# Patient Record
Sex: MALE | Race: WHITE | NOT HISPANIC OR LATINO | Employment: OTHER | URBAN - METROPOLITAN AREA
[De-identification: names, ages, dates, MRNs, and addresses within clinical notes are randomized per-mention and may not be internally consistent; named-entity substitution may affect disease eponyms.]

---

## 2022-08-29 ENCOUNTER — TELEPHONE (OUTPATIENT)
Dept: ORTHOPEDICS | Facility: CLINIC | Age: 52
End: 2022-08-29

## 2022-08-29 NOTE — TELEPHONE ENCOUNTER
Patient was seen a few days ago at [facility] diagnosed with a fractured fibula however the physician on call is not in network and would not schedule him an appointment.     [facility] ED navigator called [facility] today to help this patient schedule with an orhtopedic in [facility] with his insurance [facility]. Pt was seen in the ED at [facility] 8.26.22. Rad images were not performed since he was seen at [place] and [facility] orthopedic physician was not on call     I did agree to schedule an appt for the patient even though our physician was not on call either.  Isabelle has shared the office number with patient, he will call to schedule

## 2024-04-26 PROBLEM — S61.409A: Status: ACTIVE | Noted: 2024-04-26

## 2024-04-26 PROBLEM — S66.829A: Status: ACTIVE | Noted: 2024-04-26
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
## 2024-05-03 ENCOUNTER — OFFICE VISIT (OUTPATIENT)
Dept: ORTHOPEDICS | Facility: CLINIC | Age: 54
End: 2024-05-03
Payer: MEDICAID

## 2024-05-03 DIAGNOSIS — S66.829A EXTENSOR TENDON LACERATION OF HAND WITH OPEN WOUND, INITIAL ENCOUNTER: Primary | ICD-10-CM

## 2024-05-03 DIAGNOSIS — S61.409A EXTENSOR TENDON LACERATION OF HAND WITH OPEN WOUND, INITIAL ENCOUNTER: Primary | ICD-10-CM

## 2024-05-03 PROCEDURE — 99024 POSTOP FOLLOW-UP VISIT: CPT | Mod: ,,, | Performed by: ORTHOPAEDIC SURGERY

## 2024-05-03 PROCEDURE — 29125 APPL SHORT ARM SPLINT STATIC: CPT | Mod: PBBFAC,PO | Performed by: ORTHOPAEDIC SURGERY

## 2024-05-03 PROCEDURE — 99212 OFFICE O/P EST SF 10 MIN: CPT | Mod: PBBFAC,PO | Performed by: ORTHOPAEDIC SURGERY

## 2024-05-03 PROCEDURE — 99999PBSHW PR PBB SHADOW TECHNICAL ONLY FILED TO HB: Mod: PBBFAC,,,

## 2024-05-03 PROCEDURE — 29125 APPL SHORT ARM SPLINT STATIC: CPT | Mod: S$PBB,58,LT, | Performed by: ORTHOPAEDIC SURGERY

## 2024-05-03 PROCEDURE — 99999 PR PBB SHADOW E&M-EST. PATIENT-LVL II: CPT | Mod: PBBFAC,,, | Performed by: ORTHOPAEDIC SURGERY

## 2024-05-06 ENCOUNTER — CLINICAL SUPPORT (OUTPATIENT)
Dept: REHABILITATION | Facility: HOSPITAL | Age: 54
End: 2024-05-06
Attending: ORTHOPAEDIC SURGERY
Payer: MEDICAID

## 2024-05-06 DIAGNOSIS — S61.409A EXTENSOR TENDON LACERATION OF HAND WITH OPEN WOUND, INITIAL ENCOUNTER: ICD-10-CM

## 2024-05-06 DIAGNOSIS — S66.829A EXTENSOR TENDON LACERATION OF HAND WITH OPEN WOUND, INITIAL ENCOUNTER: ICD-10-CM

## 2024-05-06 DIAGNOSIS — M25.642 STIFFNESS OF JOINT, HAND, LEFT: Primary | ICD-10-CM

## 2024-05-06 DIAGNOSIS — R29.898 WEAKNESS OF LEFT HAND: ICD-10-CM

## 2024-05-06 DIAGNOSIS — M25.542 PAIN, JOINT, HAND, LEFT: ICD-10-CM

## 2024-05-06 DIAGNOSIS — R60.0 EDEMA OF HAND: ICD-10-CM

## 2024-05-06 PROCEDURE — L3808 WHFO, RIGID W/O JOINTS: HCPCS | Mod: PO

## 2024-05-06 PROCEDURE — 97166 OT EVAL MOD COMPLEX 45 MIN: CPT | Mod: PO

## 2024-05-06 NOTE — PLAN OF CARE
[facility]  Occupational Therapy Initial Evaluation     Name: Junaid Agarwal  Clinic Number: [id number]  5/6/2024    Therapy Diagnosis:   Encounter Diagnoses   Name Primary?    Extensor tendon laceration of hand with open wound, initial encounter     Stiffness of joint, hand, left Yes    Pain, joint, hand, left     Weakness of left hand     Edema of hand      Physician: Hakeem Sevilla MD    Physician Orders: Note:  Please begin therapy to the left hand.  Extensor tendon repair protocol.  Therapy must begin no later than Tuesday May 7th.  Please create a volar blocking thermoplastic custom splint      Frequency:  3 times per week     Duration:  6 weeks      Diagnosis: Status post left thumb extensor tendon repair    Medical Diagnosis: S66.829A,S61.409A (ICD-10-CM) - Extensor tendon laceration of hand with open wound, initial encounter     Surgical Procedure and Date: PROCEDURE:   1. Irrigation and debridement of wound measuring 3 x 1 cm in size including skin and subcutaneous tissue   2. Left thumb/hand extensor pollicis longus and brevis tendon repair     DATE OF SURGERY: 04/26/2024     S/P: 10 days on 5/6/2024  Evaluation Date: 5/6/2024  Insurance Authorization Period Expiration:  Calendar year    Plan of Care Certification Period: 6 weeks = 6/14/2024  Date of Return to Referring Provider: 5/10/2024  Visit # / Visits authorized: 1/ 1  FOTO: Adrien  / Ana  Next Reassessment at 10th visit or by 30 days = 6/05/2024    Time In: 0745  Time Out:  0830  Total Appointment Time (timed & untimed codes): 45 minutes      Precautions:  standard and post op per protocol       Subjective     Date of Onset: 4/26/2024    History of Current Condition/Mechanism of Injury: Per HPI and/or pt report   HPI:  Junaid Agarwal is a 53 y.o. male, who presents to the emergency room today.  He was using a saw earlier today when the saw hit him on the left thumb.  He was noted have a laceration.  In the ER he was  evaluated.  There is a possible tendon laceration of his extensor tendon.  He was therefore admitted to 23 hour observation for to undergo irrigation and debridement of the wound with possible tendon repair.  He only complains of an injury to his left thumb. (Pt underwent repair per above)    Involved Side: Left    Dominant Side: Right    Prior Surgical Procedure or injuries to:   RUE None reported   LUE None reported    Imaging:  See Epic    Prior Therapy: None reported    Pain:  Functional Pain Scale Rating 0-10:   2/10 on average  2/10 at best  2/10 at worst  Location: L thumb/wrist  Description: Dull  Aggravating Factors: N/A  Easing Factors: Elevation    Occupation:  Construction work  Working presently: employed  Duties: Performing light duty, no use of L hand    Functional Limitations/Social History:    Previous functional status includes: Independent with all ADLs.     Current Functional Status   Home/Living environment: at home        Limitation of Functional Status as follows:   ADLs/IADLs: Max overall limitations reported with involved UE (s)     FOTO to follow     Patient's Goals for Therapy: Get L hand back to normal function and return to full duty work.    Past Medical History/Physical Systems Review:   Junaid Agarwal  has no past medical history on file.    Junaid Agarwal  has a past surgical history that includes Finger tendon repair (Left, 4/26/2024); Irrigation and debridement of upper extremity (Left, 4/26/2024); and exploration, wound, upper extremity (Left, 4/26/2024).    Junaid has a current medication list which includes the following prescription(s): oxycodone.    Review of patient's allergies indicates:  No Known Allergies   Objective     CMS Impairment/Limitation/Restriction for FOTO Hand Survey: to follow    Observation/Inspection/Wound/Incision/Scar Post op plaster thumb spica in place. Thumb redressed with simple gauze wrap and stockingnette. Mild to moderate edema,  non pitting,   Incision closed with no drainage, sutures remain intact.    Sensation: Grossly WNL,    PROM ext L thumb +25 at IP and +15 at MP (WNL vs AROM on R)         Manual Muscle Test: NT                                       and Pinch Strength: NT at this time due to post operative status.    Special and/or Standardized Tests:  NT      Treatment     Treatment Time In:  0800  Treatment Time Out:  0830  Total Treatment time separate from Evaluation time: 30 minutes.    Stefano received therapeutic exercises for 5 minutes including: Initial Home Exercise Program Instruction for PROM ext and AROM flexion of L hand in splint at all times.    Orthotic mgmt/train: 25 min for custom volar blocking splint Yosef/train (FA based) for L thumb/wrist.    Home Exercise Program/Education:  Issued HEP (see patient instructions in EMR) and educated on modality use for pain management . Exercises were reviewed and Stefano was able to demonstrate them prior to the end of the session.   Pt received a written copy of exercises to perform at home. Stefano demonstrated good  understanding of the education provided.  Pt was advised to perform these exercises free of pain, and to stop performing them if pain occurs.    Patient/Family Education: role of OT, goals for OT, scheduling/cancellations - pt verbalized understanding.     Additional Education provided: N/A    Assessment     Junaid Agarwal is a 53 y.o. male referred to outpatient occupational therapy and presents with a medical diagnosis of S/P L thumb EPL/EPB lacerations/repairs.    Following medical record review it is determined that pt will benefit from occupational therapy services in order to maximize pain free and/or functional use of left UE. The following goals were discussed with the patient and patient is in agreement with them as to be addressed in the treatment plan. The patient's rehab potential is good.     Anticipated barriers to occupational therapy:  None.    Plan of care  discussed with patient:   Yes  Patient's spiritual, cultural and educational needs considered and patient is agreeable to the plan of care and goals as stated below:     Medical Necessity is demonstrated by the following  Occupational Profile/History  Co-morbidities and personal factors that may impact the plan of care [] LOW: Brief chart review  [x] MODERATE: Expanded chart review   [] HIGH: Extensive chart review    Moderate / High Support Documentation:  per chart review and pt report      Examination  Performance deficits relating to physical, cognitive or psychosocial skills that result in activity limitations and/or participation restrictions  [] LOW: addressing 1-3 Performance deficits  [] MODERATE: 3-5 Performance deficits  [x] HIGH: 5+ Performance deficits (please support below)    Moderate / High Support Documentation:  Per below problem list    Physical:  Joint Mobility  Muscle Power/Strength  Muscle Endurance  Skin Integrity/Scar Formation  Edema   Strength  Pinch Strength  Gross Motor Coordination  Fine Motor Coordination  Pain    Cognitive:  No Deficits    Psychosocial:    No Deficits     Treatment Options [] LOW: Limited options  [x] MODERATE: Several options  [] HIGH: Multiple options      Decision Making/ Complexity Score: moderate         The following goals were discussed with the patient and patient is in agreement with them as to be addressed in the treatment plan.     Goals:     Short Term Goals: (30 days, 6/5/2024, and/or 10th visit) unless otherwise noted below.  1. Pt will be independent with HEP and splint wear/care and precautions in 1 visit (MET).  2. Pt will report decreased pain to a 1/10 at worst in LUE with ADLs in order to increase function/use of UE.   3. Pt will demo place and hold grossly WNL at 4 week post-op swapnil in prep for return to normal use of L hand for ADL.     Long Term Goals: (by discharge)  1. Pt will report decreased pain to 1-2/10 with ADLs to allow for increased  function/use of UE.   2. Pt will exhibit WNL AROM of  L Wrist/hand to allow for Independent use of for all ADL/IADL tasks.  3. Pt will exhibit WFL  strength to allow a firm grasp during ADL/IADL (cooking utensils, tool use, carrying groceries, steering wheel, etc.)  4. Pt will exhibit WFL lateral pinch strength to allow writing,opening containers, and turning keys.  5  Pt will return to [facility] with all ADL/IADL, leisure and job tasks.    Plan   Plan of care expiration: 6 weeks = 6/14/2024      Outpatient Occupational Therapy 3 times weekly through current poc expiration date, to include the following interventions:     Moist heat, cold packs, paraffin, fluidotherapy, US, edema control, scar mobilization/scar massage, manual therapy/joint mobilizations,A/PROM, therapeutic exercises/activities, strengthening, desensitization/sensory re-education, orthotic fitting/fabrication/training, joint protections/energry conservation/adaptive equipment/activity modification, HEP/education as well as any other treatments deemed necessary based on the patient's needs or progress.     Updates Next Visit: To progress per protocol.      MERI Smith, PREETIT

## 2024-05-10 ENCOUNTER — TELEPHONE (OUTPATIENT)
Dept: ORTHOPEDICS | Facility: CLINIC | Age: 54
End: 2024-05-10

## 2024-05-10 ENCOUNTER — OFFICE VISIT (OUTPATIENT)
Dept: ORTHOPEDICS | Facility: CLINIC | Age: 54
End: 2024-05-10
Payer: MEDICAID

## 2024-05-10 VITALS — BODY MASS INDEX: 24.38 KG/M2 | WEIGHT: 180 LBS | HEIGHT: 72 IN

## 2024-05-10 DIAGNOSIS — S61.409A EXTENSOR TENDON LACERATION OF HAND WITH OPEN WOUND, INITIAL ENCOUNTER: Primary | ICD-10-CM

## 2024-05-10 DIAGNOSIS — S66.829A EXTENSOR TENDON LACERATION OF HAND WITH OPEN WOUND, INITIAL ENCOUNTER: Primary | ICD-10-CM

## 2024-05-10 PROCEDURE — 1159F MED LIST DOCD IN RCRD: CPT | Mod: CPTII,,, | Performed by: ORTHOPAEDIC SURGERY

## 2024-05-10 PROCEDURE — 99024 POSTOP FOLLOW-UP VISIT: CPT | Mod: ,,, | Performed by: ORTHOPAEDIC SURGERY

## 2024-05-10 PROCEDURE — 99212 OFFICE O/P EST SF 10 MIN: CPT | Mod: PBBFAC,PO | Performed by: ORTHOPAEDIC SURGERY

## 2024-05-10 PROCEDURE — 99999 PR PBB SHADOW E&M-EST. PATIENT-LVL II: CPT | Mod: PBBFAC,,, | Performed by: ORTHOPAEDIC SURGERY

## 2024-05-10 RX ORDER — HYDROCODONE BITARTRATE AND ACETAMINOPHEN 5; 325 MG/1; MG/1
1 TABLET ORAL EVERY 8 HOURS PRN
Qty: 8 TABLET | Refills: 0 | Status: SHIPPED | OUTPATIENT
Start: 2024-05-10

## 2024-05-10 NOTE — PROGRESS NOTES
Mr Agarwal returns to clinic today.  He was approximately 2 weeks status post left thumb wound exploration with extensor tendon repair.  He was overall doing well.  He has gone to therapy to have a splint made.  He starts the therapy protocol next week     Physical exam:  Examination of the left hand and thumb reveals that the incision is healing well.  There are sutures in place.  There is still mild edema.  He does report some decreased sensation just distal to the laceration on the dorsum of the thumb.  Capillary refill is less than 2 seconds     Assessment:  Status post left thumb wound exploration with extensor tendon repair     Plan:    1.  Sutures were removed today and Steri-Strips are placed    2.  Will continue to wear the thermoplastic splint    3. Will start working with therapy for the extensor tendon repair protocol    4.  He will follow up in 3 weeks for repeat evaluation

## 2024-05-10 NOTE — TELEPHONE ENCOUNTER
----- Message from Bill Lang sent at 5/10/2024 12:47 PM CDT -----  Patient states that his refill needs a Diagnosis Code:    HYDROcodone-acetaminophen (NORCO) 5-325 mg per tablet      [id number] - [place], [place] - [address] [address] AT [address] [address] [address] [address]  [id number] [place] [id number]  Phone: [phone] Fax: [phone]    Pt is at the pharmacy right now--  [phone]

## 2024-05-12 NOTE — PROGRESS NOTES
Mr Agarwal returns to clinic today has a history of a laceration of his left thumb with extensor tendon repair.  He was overall doing well.  He has started working with therapy.      Physical exam:  Examination of the left thumb reveals that the wound is healing well.  There are sutures in place.  There is mild edema without significant erythema or drainage.  He was able to the thumb fully extended position    Assessment: Status post left thumb extensor tendon repair    Plan:    1. His wounds were cleaned today and a thumb spica plaster splint was placed    2.  He will go to see the therapist to have thermoplastic splint created and to begin the extensor tendon repair protocol     Three.  Will follow up in 1 week for repeat evaluation which point we will consider

## 2024-05-13 ENCOUNTER — CLINICAL SUPPORT (OUTPATIENT)
Dept: REHABILITATION | Facility: HOSPITAL | Age: 54
End: 2024-05-13
Payer: MEDICAID

## 2024-05-13 DIAGNOSIS — S61.409A EXTENSOR TENDON LACERATION OF HAND WITH OPEN WOUND, INITIAL ENCOUNTER: Primary | ICD-10-CM

## 2024-05-13 DIAGNOSIS — R60.0 EDEMA OF HAND: ICD-10-CM

## 2024-05-13 DIAGNOSIS — M25.642 STIFFNESS OF JOINT, HAND, LEFT: ICD-10-CM

## 2024-05-13 DIAGNOSIS — S66.829A EXTENSOR TENDON LACERATION OF HAND WITH OPEN WOUND, INITIAL ENCOUNTER: Primary | ICD-10-CM

## 2024-05-13 DIAGNOSIS — R29.898 WEAKNESS OF LEFT HAND: ICD-10-CM

## 2024-05-13 DIAGNOSIS — M25.542 PAIN, JOINT, HAND, LEFT: ICD-10-CM
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
## 2024-05-13 PROCEDURE — 97530 THERAPEUTIC ACTIVITIES: CPT | Mod: PO

## 2024-05-13 NOTE — PROGRESS NOTES
[facility]  Occupational Therapy Treatment Note     Date: 5/13/2024  Name: Junaid Agarwal  [facility] Number: [id number]    Therapy Diagnosis:   Encounter Diagnoses   Name Primary?    Extensor tendon laceration of hand with open wound, initial encounter Yes    Contact with powered saw as cause of accidental injury     Edema of hand     Stiffness of joint, hand, left     Pain, joint, hand, left     Weakness of left hand      Physician: Hakeem Sevilla MD  Note:  Please begin therapy to the left hand.  Extensor tendon repair protocol.  Therapy must begin no later than Tuesday May 7th.  Please create a volar blocking thermoplastic custom splint      Frequency:  3 times per week     Duration:  6 weeks      Diagnosis: Status post left thumb extensor tendon repair    And per ortho note from 5/10/2024  Plan:     1.  Sutures were removed today and Steri-Strips are placed     2.  Will continue to wear the thermoplastic splint     3. Will start working with therapy for the extensor tendon repair protocol     4.  He will follow up in 3 weeks for repeat evaluation   Medical Diagnosis: S66.829A,S61.409A (ICD-10-CM) - Extensor tendon laceration of hand with open wound, initial encounter     Surgical Procedure and Date: PROCEDURE:   1. Irrigation and debridement of wound measuring 3 x 1 cm in size including skin and subcutaneous tissue   2. Left thumb/hand extensor pollicis longus and brevis tendon repair   DATE OF SURGERY: 04/26/2024      S/P: 10 days on 5/6/2024    Evaluation Date: 5/13/2024  Insurance Authorization Period Expiration:  Calendar year    Plan of Care Certification Period: 6 weeks = 6/14/2024  Date of Return to Referring Provider: 5/10/2024  Visit # / Visits authorized:  2 / 20  FOTO: Eval  / 1  Next Reassessment at 10th visit or by 30 days = 6/05/2024     Time In:  1645  Time Out:  1707  Total Appointment Time (timed & untimed codes): 23 minutes        Precautions:  standard and post op  per protocol   Subjective     Pt reports: Sutures were removed last Friday and he is doing well, only a little soreness from splint near ulnar styloid.  He was compliant with home exercise program given last session.   Response to previous treatment: Good  Functional change: None allowed, full time splint wear at this time     Pain 0-10 scale   Functional Pain Scale Rating 0-10:   2/10 on average  2/10 at best  2/10 at worst  Location: L thumb/wrist  Description: Dull  Aggravating Factors: N/A  Easing Factors: Elevation    Objective   MEASUREMENTS  Last measurements below are from Eval unless otherwise noted.    CMS Impairment/Limitation/Restriction for FOTO Hand Survey: Hand, #2nd visit = 50%     Observation/Inspection/Wound/Incision/Scar wearing his volar blocking splint, only thin scabs remain..     Sensation: Grossly WNL,     PROM ext L thumb +25 at IP and +15 at MP (WNL vs AROM on R)         Manual Muscle Test: NT                                       and Pinch Strength: NT at this time due to post operative status.     Special and/or Standardized Tests:  NT    Treatment     Stefano received the following supervised modalities after being cleared for contradictions for 0 minutes:   NT      Stefano received the following manual therapy techniques for 8 minutes:   - Gentle scar mob, retrograde massage to L thumb/scar    Stefano received therapeutic exercises for 15 minutes including:  PROM thumb ext and AROM to block of splint 3x10  PROM 3x30 sec in ext  AAROM hook fist digits 2-5 x3 each  Gentle PROM wrist ext 3x30 sec.  Flat fist on table x 2 min    Small area by styloid heated and bumped out for increased comfort. (During PROM flat fist on table)    Stefano participated in dynamic functional therapeutic activities to improve functional performance for 0  minutes, including:  NT    Home Exercises and Education Provided     Education provided:   -  Cont per previous. Can begin very gentle scar massage,  circles using abx ointment.    Written Home Exercises Provided:  Patient instructed to cont prior HEP.    Exercises were reviewed and Stefano was able to demonstrate them prior to the end of the session.  Stefano demonstrated good  understanding of the education provided.     See EMR under Media for exercises provided today and/or prior visit.        Assessment     Pt would continue to benefit from skilled OT. Pt progressing very well     - Progress towards goals:  STG #1 has been met.    Stefano is progressing well towards his goals . Pt continues with good rehab potential.     Pt will continue to benefit from skilled outpatient occupational therapy to address the deficits listed in the problem list on initial evaluation in order to maximize pt's level of independence in the home and community.     Anticipated barriers to occupational therapy: None    Pt's spiritual, cultural and educational needs considered and pt agreeable to plan of care and goals.    Goals:  Short Term Goals: (30 days, 6/5/2024, and/or 10th visit) unless otherwise noted below.  1. Pt will be independent with HEP and splint wear/care and precautions in 1 visit (MET).  2. Pt will report decreased pain to a 1/10 at worst in LUE with ADLs in order to increase function/use of UE.   3. Pt will demo place and hold grossly WNL at 4 week post-op swapnil in prep for return to normal use of L hand for ADL.      Long Term Goals: (by discharge)  1. Pt will report decreased pain to 1-2/10 with ADLs to allow for increased function/use of UE.   2. Pt will exhibit WNL AROM of  L Wrist/hand to allow for Independent use of for all ADL/IADL tasks.  3. Pt will exhibit WFL  strength to allow a firm grasp during ADL/IADL (cooking utensils, tool use, carrying groceries, steering wheel, etc.)  4. Pt will exhibit WFL lateral pinch strength to allow writing,opening containers, and turning keys.  5  Pt will return to [facility] with all ADL/IADL, leisure and job tasks.    Plan    Continue Occupational Therapy 3 times per week through current poc expiration date of 6/14/2024, in order to to decrease pain and edema, and increase A/PROM, strength, and functional use of L upper extremity.    Updates/Grading for next session:  Progress with OT per protocol      MERI Smith CHT

## 2024-05-15 ENCOUNTER — CLINICAL SUPPORT (OUTPATIENT)
Dept: REHABILITATION | Facility: HOSPITAL | Age: 54
End: 2024-05-15
Payer: MEDICAID

## 2024-05-15 DIAGNOSIS — M25.542 PAIN, JOINT, HAND, LEFT: ICD-10-CM

## 2024-05-15 DIAGNOSIS — R60.0 EDEMA OF HAND: ICD-10-CM

## 2024-05-15 DIAGNOSIS — R29.898 WEAKNESS OF LEFT HAND: ICD-10-CM

## 2024-05-15 DIAGNOSIS — M25.642 STIFFNESS OF JOINT, HAND, LEFT: Primary | ICD-10-CM

## 2024-05-15 PROCEDURE — 97530 THERAPEUTIC ACTIVITIES: CPT | Mod: PO

## 2024-05-15 NOTE — PROGRESS NOTES
[facility]  Occupational Therapy Treatment Note     Date: 5/15/2024  Name: Junaid Agarwal  [facility] Number: [id number]    Therapy Diagnosis:   Encounter Diagnoses   Name Primary?    Stiffness of joint, hand, left Yes    Pain, joint, hand, left     Weakness of left hand     Edema of hand        Physician: Hakeem Sevilla MD  Note:  Please begin therapy to the left hand.  Extensor tendon repair protocol.  Therapy must begin no later than Tuesday May 7th.  Please create a volar blocking thermoplastic custom splint      Frequency:  3 times per week     Duration:  6 weeks      Diagnosis: Status post left thumb extensor tendon repair    And per ortho note from 5/10/2024  Plan:     1.  Sutures were removed today and Steri-Strips are placed     2.  Will continue to wear the thermoplastic splint     3. Will start working with therapy for the extensor tendon repair protocol     4.  He will follow up in 3 weeks for repeat evaluation   Medical Diagnosis: S66.829A,S61.409A (ICD-10-CM) - Extensor tendon laceration of hand with open wound, initial encounter     Surgical Procedure and Date: PROCEDURE:   1. Irrigation and debridement of wound measuring 3 x 1 cm in size including skin and subcutaneous tissue   2. Left thumb/hand extensor pollicis longus and brevis tendon repair   DATE OF SURGERY: 04/26/2024      S/P: 10 days on 5/6/2024    Evaluation Date: 5/13/2024  Insurance Authorization Period Expiration:  Calendar year    Plan of Care Certification Period: 6 weeks = 6/14/2024  Date of Return to Referring Provider: 5/10/2024  Visit # / Visits authorized:  3 / 20  FOTO: Adrien  / Ana  Next Reassessment at 10th visit or by 30 days = 6/05/2024     Time In:  1545  Time Out:  1600  Total Appointment Time (timed  & untimed codes): 15 minutes        Precautions:  standard and post op per protocol   Subjective     Pt reports: Thinks a suture is still in.  He was compliant with home exercise program given last  session.   Response to previous treatment: Good  Functional change: None allowed, full time splint wear at this time     Pain 0-10 scale   Functional Pain Scale Rating 0-10:   2/10 on average  2/10 at best  2/10 at worst  Location: L thumb/wrist  Description: Dull  Aggravating Factors: N/A  Easing Factors: Elevation    Objective   MEASUREMENTS  Last measurements below are from Eval unless otherwise noted.    CMS Impairment/Limitation/Restriction for FOTO Hand Survey: Hand, #2nd visit = 50%     Observation/Inspection/Wound/Incision/Scar wearing his volar blocking splint, only thin scabs remain..     Sensation: Grossly WNL,     PROM ext L thumb +25 at IP and +15 at MP (WNL vs AROM on R)         Manual Muscle Test: NT                                       and Pinch Strength: NT at this time due to post operative status.     Special and/or Standardized Tests:  NT    Treatment     Stefano received the following supervised modalities after being cleared for contradictions for 0 minutes:   NT      Stefano received the following manual therapy techniques for 2  minutes:   - Gentle scar mob, retrograde massage to L thumb/scar    Stefano received therapeutic exercises for 12 minutes including:  PROM thumb ext and AROM to block of splint 3x10  PROM 3x30 sec in ext  AAROM hook fist digits 2-5 x3 each (NT)  Gentle PROM wrist ext 3x30 sec.  Flat fist on table x 2 min    Suture removal: 1 stray suture at wed space removed this date with no bleeding and no pain reported, and no signs of infection. 1 min    Small area by styloid heated and bumped out for increased comfort. (During PROM flat fist on table)    Stefano participated in dynamic functional therapeutic activities to improve functional performance for 0  minutes, including:  NT    Home Exercises and Education Provided     Education provided:   -  Cont per previous. Wait until next date and can begin scar massage, circles using cocoa butter 1-2 x day..    Written Home  Exercises Provided:  Patient instructed to cont prior HEP.    Exercises were reviewed and Stefano was able to demonstrate them prior to the end of the session.  Stefano demonstrated good  understanding of the education provided.     See EMR under Media for exercises provided today and/or prior visit.        Assessment     Pt would continue to benefit from skilled OT. Pt progressing very well,      - Progress towards goals:  STG #1 has been met.    Stefano is progressing well towards his goals . Pt continues with good rehab potential.     Pt will continue to benefit from skilled outpatient occupational therapy to address the deficits listed in the problem list on initial evaluation in order to maximize pt's level of independence in the home and community.     Anticipated barriers to occupational therapy: None    Pt's spiritual, cultural and educational needs considered and pt agreeable to plan of care and goals.    Goals:  Short Term Goals: (30 days, 6/5/2024, and/or 10th visit) unless otherwise noted below.  1. Pt will be independent with HEP and splint wear/care and precautions in 1 visit (MET).  2. Pt will report decreased pain to a 1/10 at worst in LUE with ADLs in order to increase function/use of UE.   3. Pt will demo place and hold grossly WNL at 4 week post-op swapnil in prep for return to normal use of L hand for ADL.      Long Term Goals: (by discharge)  1. Pt will report decreased pain to 1-2/10 with ADLs to allow for increased function/use of UE.   2. Pt will exhibit WNL AROM of  L Wrist/hand to allow for Independent use of for all ADL/IADL tasks.  3. Pt will exhibit WFL  strength to allow a firm grasp during ADL/IADL (cooking utensils, tool use, carrying groceries, steering wheel, etc.)  4. Pt will exhibit WFL lateral pinch strength to allow writing,opening containers, and turning keys.  5  Pt will return to PLOF with all ADL/IADL, leisure and job tasks.    Plan   Continue Occupational Therapy 3 times  per week through current poc expiration date of 6/14/2024, in order to to decrease pain and edema, and increase A/PROM, strength, and functional use of L upper extremity.    Updates/Grading for next session:  Progress with OT per protocol      MERI Smith CHT

## 2024-05-20 ENCOUNTER — CLINICAL SUPPORT (OUTPATIENT)
Dept: REHABILITATION | Facility: HOSPITAL | Age: 54
End: 2024-05-20
Payer: MEDICAID

## 2024-05-20 DIAGNOSIS — R60.0 EDEMA OF HAND: ICD-10-CM

## 2024-05-20 DIAGNOSIS — R29.898 WEAKNESS OF LEFT HAND: ICD-10-CM

## 2024-05-20 DIAGNOSIS — M25.542 PAIN, JOINT, HAND, LEFT: ICD-10-CM

## 2024-05-20 DIAGNOSIS — M25.642 STIFFNESS OF JOINT, HAND, LEFT: Primary | ICD-10-CM

## 2024-05-20 PROCEDURE — 97530 THERAPEUTIC ACTIVITIES: CPT | Mod: PO

## 2024-05-20 NOTE — PROGRESS NOTES
[facility]  Occupational Therapy Treatment Note     Date: 5/20/2024  Name: Junaid Agarwal  [facility] Number: [id number]    Therapy Diagnosis:   Encounter Diagnoses   Name Primary?    Stiffness of joint, hand, left Yes    Pain, joint, hand, left     Weakness of left hand     Edema of hand          Physician: Hakeem Sevilla MD  Note:  Please begin therapy to the left hand.  Extensor tendon repair protocol.  Therapy must begin no later than Tuesday May 7th.  Please create a volar blocking thermoplastic custom splint      Frequency:  3 times per week     Duration:  6 weeks      Diagnosis: Status post left thumb extensor tendon repair    And per ortho note from 5/10/2024  Plan:     1.  Sutures were removed today and Steri-Strips are placed     2.  Will continue to wear the thermoplastic splint     3. Will start working with therapy for the extensor tendon repair protocol     4.  He will follow up in 3 weeks for repeat evaluation   Medical Diagnosis: S66.829A,S61.409A (ICD-10-CM) - Extensor tendon laceration of hand with open wound, initial encounter     Surgical Procedure and Date: PROCEDURE:   1. Irrigation and debridement of wound measuring 3 x 1 cm in size including skin and subcutaneous tissue   2. Left thumb/hand extensor pollicis longus and brevis tendon repair   DATE OF SURGERY: 04/26/2024      S/P: 10 days on 5/6/2024    Evaluation Date: 5/13/2024  Insurance Authorization Period Expiration:  Calendar year    Plan of Care Certification Period: 6 weeks = 6/14/2024  Date of Return to Referring Provider: 5/10/2024  Visit # / Visits authorized:  4 / 20  FOTO: Adrien  / Ana  Next Reassessment at 10th visit or by 30 days = 6/05/2024     Time In:  1638  Time Out: 1648  Total Appointment Time (timed  & untimed codes): 10 minutes        Precautions:  standard and post op per protocol   Subjective     Pt reports: Thinks a suture is still in.  He was compliant with home exercise program given last  session.   Response to previous treatment: Good  Functional change: None allowed, full time splint wear at this time     Pain 0-10 scale   Functional Pain Scale Rating 0-10:   2/10 on average  2/10 at best  2/10 at worst  Location: L thumb/wrist  Description: Dull  Aggravating Factors: N/A  Easing Factors: Elevation    Objective   MEASUREMENTS  Last measurements below are from Eval unless otherwise noted.    CMS Impairment/Limitation/Restriction for FOTO Hand Survey: Hand, #2nd visit = 50%     Observation/Inspection/Wound/Incision/Scar wearing his volar blocking splint, only thin scabs remain..     Sensation: Grossly WNL,     PROM ext L thumb +25 at IP and +15 at MP (WNL vs AROM on R)         Manual Muscle Test: NT                                       and Pinch Strength: NT at this time due to post operative status.     Special and/or Standardized Tests:  NT    Treatment     Stefano received the following supervised modalities after being cleared for contradictions for 0 minutes:   NT      Stefano received the following manual therapy techniques for 2  minutes:   - Gentle scar mob, retrograde massage to L thumb/scar    Stefano received therapeutic exercises for 8 minutes including:  PROM thumb ext and AROM to block of splint 3x10  PROM 3x30 sec in ext (NT)  AAROM hook fist digits 2-5 x3 each (NT)  Gentle PROM wrist ext 3x30 sec.  Flat fist on table x 2 min (NT)        Stefano participated in dynamic functional therapeutic activities to improve functional performance for 0  minutes, including:  NT    Home Exercises and Education Provided     Education provided:   -  Cont per previous.     Written Home Exercises Provided:  Patient instructed to cont prior HEP.    Exercises were reviewed and Stefano was able to demonstrate them prior to the end of the session.  Stefano demonstrated good  understanding of the education provided.     See EMR under Media for exercises provided today and/or prior visit.         Assessment     Pt would continue to benefit from skilled OT. Pt progressing very well,      - Progress towards goals:  STG #1 has been met.    Stefano is progressing well towards his goals . Pt continues with good rehab potential.     Pt will continue to benefit from skilled outpatient occupational therapy to address the deficits listed in the problem list on initial evaluation in order to maximize pt's level of independence in the home and community.     Anticipated barriers to occupational therapy: None    Pt's spiritual, cultural and educational needs considered and pt agreeable to plan of care and goals.    Goals:  Short Term Goals: (30 days, 6/5/2024, and/or 10th visit) unless otherwise noted below.  1. Pt will be independent with HEP and splint wear/care and precautions in 1 visit (MET).  2. Pt will report decreased pain to a 1/10 at worst in LUE with ADLs in order to increase function/use of UE.   3. Pt will demo place and hold grossly WNL at 4 week post-op swapnil in prep for return to normal use of L hand for ADL.      Long Term Goals: (by discharge)  1. Pt will report decreased pain to 1-2/10 with ADLs to allow for increased function/use of UE.   2. Pt will exhibit WNL AROM of  L Wrist/hand to allow for Independent use of for all ADL/IADL tasks.  3. Pt will exhibit WFL  strength to allow a firm grasp during ADL/IADL (cooking utensils, tool use, carrying groceries, steering wheel, etc.)  4. Pt will exhibit WFL lateral pinch strength to allow writing,opening containers, and turning keys.  5  Pt will return to PLOF with all ADL/IADL, leisure and job tasks.    Plan   Continue Occupational Therapy 3 times per week through current poc expiration date of 6/14/2024, in order to to decrease pain and edema, and increase A/PROM, strength, and functional use of L upper extremity.    Updates/Grading for next session:  Progress with OT per protocol      MERI Smith, ERIN

## 2024-05-22 ENCOUNTER — CLINICAL SUPPORT (OUTPATIENT)
Dept: REHABILITATION | Facility: HOSPITAL | Age: 54
End: 2024-05-22
Payer: MEDICAID

## 2024-05-22 DIAGNOSIS — R29.898 WEAKNESS OF LEFT HAND: ICD-10-CM

## 2024-05-22 DIAGNOSIS — M25.642 STIFFNESS OF JOINT, HAND, LEFT: Primary | ICD-10-CM

## 2024-05-22 DIAGNOSIS — M25.542 PAIN, JOINT, HAND, LEFT: ICD-10-CM

## 2024-05-22 DIAGNOSIS — R60.0 EDEMA OF HAND: ICD-10-CM

## 2024-05-22 PROCEDURE — 97530 THERAPEUTIC ACTIVITIES: CPT | Mod: PO

## 2024-05-22 NOTE — PROGRESS NOTES
[facility]  Occupational Therapy Treatment Note     Date: 5/22/2024  Name: Junaid Agarwal  [facility] Number: [id number]    Therapy Diagnosis:   Encounter Diagnoses   Name Primary?    Stiffness of joint, hand, left Yes    Pain, joint, hand, left     Weakness of left hand     Edema of hand        Physician: Hakeem Sevilla MD  Note:  Please begin therapy to the left hand.  Extensor tendon repair protocol.  Therapy must begin no later than Tuesday May 7th.  Please create a volar blocking thermoplastic custom splint      Frequency:  3 times per week     Duration:  6 weeks      Diagnosis: Status post left thumb extensor tendon repair    And per ortho note from 5/10/2024  Plan:     1.  Sutures were removed today and Steri-Strips are placed     2.  Will continue to wear the thermoplastic splint     3. Will start working with therapy for the extensor tendon repair protocol     4.  He will follow up in 3 weeks for repeat evaluation   Medical Diagnosis: S66.829A,S61.409A (ICD-10-CM) - Extensor tendon laceration of hand with open wound, initial encounter     Surgical Procedure and Date: PROCEDURE:   1. Irrigation and debridement of wound measuring 3 x 1 cm in size including skin and subcutaneous tissue   2. Left thumb/hand extensor pollicis longus and brevis tendon repair   DATE OF SURGERY: 04/26/2024      S/P: 3 weeks and 5 days on 5/22/2024    Evaluation Date: 5/13/2024  Insurance Authorization Period Expiration:  Calendar year    Plan of Care Certification Period: 6 weeks = 6/14/2024  Date of Return to Referring Provider: 5/10/2024  Visit # / Visits authorized:  5 / 20  FOTO: Adrien  / Ana  Next Reassessment at 10th visit or by 30 days = 6/05/2024     Time In: 1555  Time Out: 1610  Total Appointment Time (timed  & untimed codes): 15 minutes        Precautions:  standard and post op per protocol   Subjective     Pt reports: No problems or complaints  He was compliant with home exercise program given  last session.   Response to previous treatment: Good  Functional change: None allowed, full time splint wear at this time     Pain 0-10 scale   Functional Pain Scale Rating 0-10:   2/10 on average  2/10 at best  2/10 at worst  Location: L thumb/wrist  Description: Dull  Aggravating Factors: N/A  Easing Factors: Elevation    Objective   MEASUREMENTS  Last measurements below are from Eval unless otherwise noted.    CMS Impairment/Limitation/Restriction for FOTO Hand Survey: Hand, #2nd visit = 50%     Observation/Inspection/Wound/Incision/Scar wearing his volar blocking splint, only thin scabs remain..     Sensation: Grossly WNL,     PROM ext L thumb +25 at IP and +15 at MP (WNL vs AROM on R)         Manual Muscle Test: NT                                       and Pinch Strength: NT at this time due to post operative status.     Special and/or Standardized Tests:  NT    Treatment     Mr. Agarwal received the following supervised modalities after being cleared for contradictions for 0 minutes:   NT      Mr. Agarwal received the following manual therapy techniques for 5 minutes:   - Gentle scar mob, retrograde massage to L thumb/scar    Mr. Agarwal received therapeutic exercises for 10 minutes including:  PROM thumb ext and AROM to block of splint 3x10  PROM 3x30 sec in ext (NT)  AAROM hook fist digits 2-5 x3 each (NT)  Gentle PROM wrist ext 3x30 sec.  Flat fist on table x 2 min       Stefano participated in dynamic functional therapeutic activities to improve functional performance for 0  minutes, including:  NT    Home Exercises and Education Provided     Education provided:   -  Cont per previous.     Written Home Exercises Provided:  Patient instructed to cont prior HEP.    Exercises were reviewed and Mr. Agarwal was able to demonstrate them prior to the end of the session.  Stefano demonstrated good  understanding of the education provided.     See EMR under Media for exercises provided today and/or prior visit.         Assessment     Pt would continue to benefit from skilled OT. Pt progressing very well,      - Progress towards goals:  STG #1 has been met.    Stefano is progressing well towards his goals . Pt continues with good rehab potential.     Pt will continue to benefit from skilled outpatient occupational therapy to address the deficits listed in the problem list on initial evaluation in order to maximize pt's level of independence in the home and community.     Anticipated barriers to occupational therapy: None    Pt's spiritual, cultural and educational needs considered and pt agreeable to plan of care and goals.    Goals:  Short Term Goals: (30 days, 6/5/2024, and/or 10th visit) unless otherwise noted below.  1. Pt will be independent with HEP and splint wear/care and precautions in 1 visit (MET).  2. Pt will report decreased pain to a 1/10 at worst in LUE with ADLs in order to increase function/use of UE.   3. Pt will demo place and hold grossly WNL at 4 week post-op swapnil in prep for return to normal use of L hand for ADL.      Long Term Goals: (by discharge)  1. Pt will report decreased pain to 1-2/10 with ADLs to allow for increased function/use of UE.   2. Pt will exhibit WNL AROM of  L Wrist/hand to allow for Independent use of for all ADL/IADL tasks.  3. Pt will exhibit WFL  strength to allow a firm grasp during ADL/IADL (cooking utensils, tool use, carrying groceries, steering wheel, etc.)  4. Pt will exhibit WFL lateral pinch strength to allow writing,opening containers, and turning keys.  5  Pt will return to PLOF with all ADL/IADL, leisure and job tasks.    Plan   Continue Occupational Therapy 3 times per week through current poc expiration date of 6/14/2024, in order to to decrease pain and edema, and increase A/PROM, strength, and functional use of L upper extremity.    Updates/Grading for next session:  Progress with OT per protocol      MERI Smith, ERIN

## 2024-05-22 NOTE — PROGRESS NOTES
[facility]  Occupational Therapy Treatment Note     Date: 5/24/2024  Name: Junaid Agarwal  [facility] Number: [id number]    Therapy Diagnosis:   Encounter Diagnoses   Name Primary?    Stiffness of joint, hand, left Yes    Pain, joint, hand, left     Weakness of left hand     Edema of hand        Physician: Hakeem Sevilla MD  Note:  Please begin therapy to the left hand.  Extensor tendon repair protocol.  Therapy must begin no later than Tuesday May 7th.  Please create a volar blocking thermoplastic custom splint      Frequency:  3 times per week     Duration:  6 weeks      Diagnosis: Status post left thumb extensor tendon repair    And per ortho note from 5/10/2024  Plan:     1.  Sutures were removed today and Steri-Strips are placed     2.  Will continue to wear the thermoplastic splint     3. Will start working with therapy for the extensor tendon repair protocol     4.  He will follow up in 3 weeks for repeat evaluation   Medical Diagnosis: S66.829A,S61.409A (ICD-10-CM) - Extensor tendon laceration of hand with open wound, initial encounter     Surgical Procedure and Date: PROCEDURE:   1. Irrigation and debridement of wound measuring 3 x 1 cm in size including skin and subcutaneous tissue   2. Left thumb/hand extensor pollicis longus and brevis tendon repair   DATE OF SURGERY: 04/26/2024      S/P: 3 weeks and 5 days on 5/22/2024    Evaluation Date: 5/13/2024  Insurance Authorization Period Expiration:  Calendar year    Plan of Care Certification Period: 6 weeks = 6/14/2024  Date of Return to Referring Provider: 5/10/2024  Visit # / Visits authorized:  6 / 20  FOTO: Adrien  / Ana  Next Reassessment at 10th visit or by 30 days = 6/05/2024     Time In: 1410  Time Out: 1435  Total Appointment Time: 25 min (timed min & untimed codes): 25 min      Precautions:  standard and post op per protocol   Subjective     Pt reports: No problems or complaints  He was compliant with home exercise program  given last session.   Response to previous treatment: Good  Functional change: None allowed, full time splint wear at this time     Pain 0-10 scale   Functional Pain Scale Rating 0-10:   2/10 on average  2/10 at best  2/10 at worst  Location: L thumb/wrist  Description: Dull  Aggravating Factors: N/A  Easing Factors: Elevation    Objective   MEASUREMENTS  Last measurements below are from Eval unless otherwise noted.    CMS Impairment/Limitation/Restriction for FOTO Hand Survey: Hand, #2nd visit = 50%     Observation/Inspection/Wound/Incision/Scar wearing his volar blocking splint, only thin scabs remain..     Sensation: Grossly WNL,     PROM ext L thumb +25 at IP and +15 at MP (WNL vs AROM on R)         Manual Muscle Test: NT                                       and Pinch Strength: NT at this time due to post operative status.     Special and/or Standardized Tests:  NT    Treatment     Mr. Agarwal received the following supervised modalities after being cleared for contradictions for 10 minutes:   Paraffin 10 min L thumb at 125 degrees    Mr. Agarwal received the following manual therapy techniques for 5 minutes:   - Gentle scar mob, retrograde massage to L thumb/scar    Mr. Agarwal received therapeutic exercises for 10 minutes including:  PROM thumb ext and AROM to block of splint 3x10  PROM 3x30 sec in ext (NT)  Gentle PROM wrist ext 3x30 sec.  Flat fist on table x 2 min   Place and hold 2x10 in splint.      Stefano participated in dynamic functional therapeutic activities to improve functional performance for 0  minutes, including:  NT    Home Exercises and Education Provided     Education provided:   -  Cont per previous. Add place and hold every 1-2 hours when awake.    Written Home Exercises Provided:  Patient instructed to cont prior HEP.    Exercises were reviewed and Mr. Agarwal was able to demonstrate them prior to the end of the session.  Stefano demonstrated good  understanding of the education  provided.     See EMR under Media for exercises provided today and/or prior visit.        Assessment     Pt would continue to benefit from skilled OT. Pt progressing very well, with intact place and hold today.   Will cont to progress per protocol    - Progress towards goals:  STG #1 has been met.    Stefano is progressing well towards his goals . Pt continues with good rehab potential.     Pt will continue to benefit from skilled outpatient occupational therapy to address the deficits listed in the problem list on initial evaluation in order to maximize pt's level of independence in the home and community.     Anticipated barriers to occupational therapy: None    Pt's spiritual, cultural and educational needs considered and pt agreeable to plan of care and goals.    Goals:  Short Term Goals: (30 days, 6/5/2024, and/or 10th visit) unless otherwise noted below.  1. Pt will be independent with HEP and splint wear/care and precautions in 1 visit (MET).  2. Pt will report decreased pain to a 1/10 at worst in LUE with ADLs in order to increase function/use of UE.   3. Pt will demo place and hold grossly WNL at 4 week post-op swapnil in prep for return to normal use of L hand for ADL.      Long Term Goals: (by discharge)  1. Pt will report decreased pain to 1-2/10 with ADLs to allow for increased function/use of UE.   2. Pt will exhibit WNL AROM of  L Wrist/hand to allow for Independent use of for all ADL/IADL tasks.  3. Pt will exhibit WFL  strength to allow a firm grasp during ADL/IADL (cooking utensils, tool use, carrying groceries, steering wheel, etc.)  4. Pt will exhibit WFL lateral pinch strength to allow writing,opening containers, and turning keys.  5  Pt will return to PLOF with all ADL/IADL, leisure and job tasks.    Plan   Continue Occupational Therapy 3 times per week through current poc expiration date of 6/14/2024, in order to to decrease pain and edema, and increase A/PROM, strength, and functional use of  L upper extremity.    Updates/Grading for next session:  Progress with OT per protocol      MERI Smith, PREETIT

## 2024-05-24 ENCOUNTER — CLINICAL SUPPORT (OUTPATIENT)
Dept: REHABILITATION | Facility: HOSPITAL | Age: 54
End: 2024-05-24
Payer: MEDICAID

## 2024-05-24 DIAGNOSIS — R60.0 EDEMA OF HAND: ICD-10-CM

## 2024-05-24 DIAGNOSIS — M25.642 STIFFNESS OF JOINT, HAND, LEFT: Primary | ICD-10-CM

## 2024-05-24 DIAGNOSIS — M25.542 PAIN, JOINT, HAND, LEFT: ICD-10-CM

## 2024-05-24 DIAGNOSIS — R29.898 WEAKNESS OF LEFT HAND: ICD-10-CM

## 2024-05-24 PROCEDURE — 97530 THERAPEUTIC ACTIVITIES: CPT | Mod: PO

## 2024-05-29 ENCOUNTER — CLINICAL SUPPORT (OUTPATIENT)
Dept: REHABILITATION | Facility: HOSPITAL | Age: 54
End: 2024-05-29
Payer: MEDICAID

## 2024-05-29 DIAGNOSIS — M25.542 PAIN, JOINT, HAND, LEFT: ICD-10-CM

## 2024-05-29 DIAGNOSIS — R29.898 WEAKNESS OF LEFT HAND: ICD-10-CM

## 2024-05-29 DIAGNOSIS — M25.642 STIFFNESS OF JOINT, HAND, LEFT: Primary | ICD-10-CM

## 2024-05-29 DIAGNOSIS — R60.0 EDEMA OF HAND: ICD-10-CM

## 2024-05-29 PROCEDURE — 97530 THERAPEUTIC ACTIVITIES: CPT | Mod: PO

## 2024-05-29 NOTE — PROGRESS NOTES
[facility]  Occupational Therapy Treatment Note     Date: 5/29/2024  Name: Junaid Agarwal  [facility] Number: [id number]    Therapy Diagnosis:   No diagnosis found.      Physician: Hakeem Sevilla MD  Note:  Please begin therapy to the left hand.  Extensor tendon repair protocol.  Therapy must begin no later than Tuesday May 7th.  Please create a volar blocking thermoplastic custom splint      Frequency:  3 times per week     Duration:  6 weeks      Diagnosis: Status post left thumb extensor tendon repair    And per ortho note from 5/10/2024  Plan:     1.  Sutures were removed today and Steri-Strips are placed     2.  Will continue to wear the thermoplastic splint     3. Will start working with therapy for the extensor tendon repair protocol     4.  He will follow up in 3 weeks for repeat evaluation   Medical Diagnosis: S66.829A,S61.409A (ICD-10-CM) - Extensor tendon laceration of hand with open wound, initial encounter     Surgical Procedure and Date: PROCEDURE:   1. Irrigation and debridement of wound measuring 3 x 1 cm in size including skin and subcutaneous tissue   2. Left thumb/hand extensor pollicis longus and brevis tendon repair   DATE OF SURGERY: 04/26/2024      S/P: 3 weeks and 5 days on 5/22/2024    Evaluation Date: 5/13/2024  Insurance Authorization Period Expiration:  Calendar year    Plan of Care Certification Period: 6 weeks = 6/14/2024  Date of Return to Referring Provider: 5/10/2024  Visit # / Visits authorized:  6 / 20  FOTO: Eval  / 1  Next Reassessment at 10th visit or by 30 days = 6/05/2024     Time In: 1545  Time Out: 1610  Total Appointment Time: 25 min (timed min & untimed codes): 25 min      Precautions:  standard and post op per protocol   Subjective     Pt reports: No problems or complaints  He was compliant with home exercise program given last session.   Response to previous treatment: Good  Functional change: None allowed, full time splint wear at this time      Pain 0-10 scale   Functional Pain Scale Rating 0-10:   2/10 on average  2/10 at best  2/10 at worst  Location: L thumb/wrist  Description: Dull  Aggravating Factors: N/A  Easing Factors: Elevation    Objective   MEASUREMENTS  Last measurements below are from Eval unless otherwise noted.    CMS Impairment/Limitation/Restriction for FOTO Hand Survey: Hand, #2nd visit = 50%     Observation/Inspection/Wound/Incision/Scar wearing his volar blocking splint, no scab remains, healed     Sensation: Grossly WNL,     PROM ext L thumb +25 at IP and +15 at MP (WNL vs AROM on R)         Manual Muscle Test: NT                                       and Pinch Strength: NT at this time due to post operative status.     Special and/or Standardized Tests:  NT    Treatment     Mr. Agarwal received the following supervised modalities after being cleared for contradictions for 0 minutes:   Paraffin 10 min L thumb at 125 degrees (NT)    Mr. Agarwal received the following manual therapy techniques for 15 minutes:   - Gentle scar mob, retrograde massage to L thumb/scar    Mr. Agarwal received therapeutic exercises for 10 minutes including:  PROM thumb ext and AROM to block of splint 3x10  PROM 3x30 sec in ext (NT)  Gentle PROM wrist ext 3x30 sec.  Flat fist on table x 2 min   Place and hold 2x10 in splint.      Stefano participated in dynamic functional therapeutic activities to improve functional performance for 0  minutes, including:  NT    Home Exercises and Education Provided     Education provided:   -  Cont per previous.     Written Home Exercises Provided:  Patient instructed to cont prior HEP.    Exercises were reviewed and Mr. Agarwal was able to demonstrate them prior to the end of the session.  Stefano demonstrated good  understanding of the education provided.     See EMR under Media for exercises provided today and/or prior visit.        Assessment     Pt would continue to benefit from skilled OT. Excellent place and  hold with hyperext today. Will cont to progress per protocol    - Progress towards goals:  STG #1 has been met.    Stefano is progressing well towards his goals . Pt continues with good rehab potential.     Pt will continue to benefit from skilled outpatient occupational therapy to address the deficits listed in the problem list on initial evaluation in order to maximize pt's level of independence in the home and community.     Anticipated barriers to occupational therapy: None    Pt's spiritual, cultural and educational needs considered and pt agreeable to plan of care and goals.    Goals:  Short Term Goals: (30 days, 6/5/2024, and/or 10th visit) unless otherwise noted below.  1. Pt will be independent with HEP and splint wear/care and precautions in 1 visit (MET).  2. Pt will report decreased pain to a 1/10 at worst in LUE with ADLs in order to increase function/use of UE.   3. Pt will demo place and hold grossly WNL at 4 week post-op swapnil in prep for return to normal use of L hand for ADL.      Long Term Goals: (by discharge)  1. Pt will report decreased pain to 1-2/10 with ADLs to allow for increased function/use of UE.   2. Pt will exhibit WNL AROM of  L Wrist/hand to allow for Independent use of for all ADL/IADL tasks.  3. Pt will exhibit WFL  strength to allow a firm grasp during ADL/IADL (cooking utensils, tool use, carrying groceries, steering wheel, etc.)  4. Pt will exhibit WFL lateral pinch strength to allow writing,opening containers, and turning keys.  5  Pt will return to PLOF with all ADL/IADL, leisure and job tasks.    Plan   Continue Occupational Therapy 3 times per week through current poc expiration date of 6/14/2024, in order to to decrease pain and edema, and increase A/PROM, strength, and functional use of L upper extremity.    Updates/Grading for next session:  Progress with OT per protocol      MERI Smith CHT

## 2024-05-30 NOTE — PROGRESS NOTES
[facility]  Occupational Therapy Treatment Note     Date: 6/3/2024  Name: Junaid Agrawal  [facility] Number: [id number]    Therapy Diagnosis:   Encounter Diagnoses   Name Primary?    Stiffness of joint, hand, left Yes    Pain, joint, hand, left     Weakness of left hand     Edema of hand          Physician: Hakeem Sevilla MD  Note:  Please begin therapy to the left hand.  Extensor tendon repair protocol.  Therapy must begin no later than Tuesday May 7th.  Please create a volar blocking thermoplastic custom splint      Frequency:  3 times per week     Duration:  6 weeks      Diagnosis: Status post left thumb extensor tendon repair    And per ortho note from 5/10/2024  Plan:     1.  Sutures were removed today and Steri-Strips are placed     2.  Will continue to wear the thermoplastic splint     3. Will start working with therapy for the extensor tendon repair protocol     4.  He will follow up in 3 weeks for repeat evaluation   Medical Diagnosis: S66.829A,S61.409A (ICD-10-CM) - Extensor tendon laceration of hand with open wound, initial encounter     Surgical Procedure and Date: PROCEDURE:   1. Irrigation and debridement of wound measuring 3 x 1 cm in size including skin and subcutaneous tissue   2. Left thumb/hand extensor pollicis longus and brevis tendon repair   DATE OF SURGERY: 04/26/2024      S/P: 5 weeks  on 5/31/2024    Evaluation Date: 5/13/2024  Insurance Authorization Period Expiration:  Calendar year    Plan of Care Certification Period: 6 weeks = 6/14/2024  Date of Return to Referring Provider: 5/10/2024  Visit # / Visits authorized:  7 / 20  FOTO: Adrien  / Ana  Next Reassessment at 10th visit or by 30 days = 6/05/2024     Time In: 1500  Time Out: 1533  Total Appointment Time: 33 min (timed min & untimed codes):      Precautions:  standard and post op per protocol   Subjective     Pt reports: No problems or complaints with HEP upgrade last visit.   He was compliant with home  exercise program given last session.   Response to previous treatment: Good  Functional change: None allowed, full time splint wear at this time     Pain 0-10 scale   Functional Pain Scale Rating 0-10:   2/10 on average  2/10 at best  2/10 at worst  Location: L thumb/wrist  Description: Dull  Aggravating Factors: N/A  Easing Factors: Elevation    Objective   MEASUREMENTS  Last measurements below are from Eval unless otherwise noted.    CMS Impairment/Limitation/Restriction for FOTO Hand Survey: Hand, #2nd visit = 50%     Observation/Inspection/Wound/Incision/Scar wearing his volar blocking splint, no scab remains, healed     Sensation: Grossly WNL,     PROM ext L thumb +25 at IP and +15 at MP (WNL vs AROM on R)         Manual Muscle Test: NT                                       and Pinch Strength: NT at this time due to post operative status.     Special and/or Standardized Tests:  NT    Treatment     Mr. Agarwal received the following supervised modalities after being cleared for contradictions for 0 minutes:   Paraffin 10 min L thumb at 125 degrees (NT)    Mr. Agarwal received the following manual therapy techniques for 7 minutes:   - Gentle scar mob, retrograde massage to L thumb/scar    Mr. Agarwal received therapeutic exercises for 26 minutes including:  PROM thumb ext and AROM to block of splint 3x10  PROM thumb retroposition 30x   Gentle PROM isolated  IP/MP E/F composite to 30 degrees flexion   Gentle passive wrist tenodesis approx 20 flexion-20 degree extension  Flat fist on table x 2 min (NT)  Place and hold 3x10 in splint.   Active thumb extension followed by active flexion to limits of orthosis  Active wave, hook, fist, straight fist 20x ea in splint with thumb stabilized with straps    *Billed as 33 min therapeutic activity per Medicaid guidelines in the state of [place].   Stefano participated in dynamic functional therapeutic activities to improve functional performance for 0  minutes,  including:  NT    Home Exercises and Education Provided     Education provided:   -  Cont per previous.     Written Home Exercises Provided:  Patient instructed to cont prior HEP.    Exercises were reviewed and Mr. Agarwal was able to demonstrate them prior to the end of the session.  Stefano demonstrated good  understanding of the education provided.     See EMR under Media for exercises provided today and/or prior visit.        Assessment     Pt would continue to benefit from skilled OT. Pt continues with excellent active retroposition with no extension lag observed.      - Progress towards goals:  STG #3 has been met.    Stefano is progressing well towards his goals . Pt continues with good rehab potential.     Pt will continue to benefit from skilled outpatient occupational therapy to address the deficits listed in the problem list on initial evaluation in order to maximize pt's level of independence in the home and community.     Anticipated barriers to occupational therapy: None    Pt's spiritual, cultural and educational needs considered and pt agreeable to plan of care and goals.    Goals:  Short Term Goals: (30 days, 6/5/2024, and/or 10th visit) unless otherwise noted below.  1. Pt will be independent with HEP and splint wear/care and precautions in 1 visit (MET).  2. Pt will report decreased pain to a 1/10 at worst in LUE with ADLs in order to increase function/use of UE.   3. Pt will demo place and hold grossly WNL at 4 week post-op swapnil in prep for return to normal use of L hand for ADL. (Met)      Long Term Goals: (by discharge)  1. Pt will report decreased pain to 1-2/10 with ADLs to allow for increased function/use of UE.   2. Pt will exhibit WNL AROM of  L Wrist/hand to allow for Independent use of for all ADL/IADL tasks.  3. Pt will exhibit WFL  strength to allow a firm grasp during ADL/IADL (cooking utensils, tool use, carrying groceries, steering wheel, etc.)  4. Pt will exhibit WFL lateral  pinch strength to allow writing,opening containers, and turning keys.  5  Pt will return to PLOF with all ADL/IADL, leisure and job tasks.    Plan   Continue Occupational Therapy 3 times per week through current poc expiration date of 6/14/2024, in order to to decrease pain and edema, and increase A/PROM, strength, and functional use of L upper extremity.    Updates/Grading for next session:  Progress with OT per protocol    MERI Knott, PREETIT

## 2024-05-31 ENCOUNTER — CLINICAL SUPPORT (OUTPATIENT)
Dept: REHABILITATION | Facility: HOSPITAL | Age: 54
End: 2024-05-31
Payer: MEDICAID

## 2024-05-31 ENCOUNTER — OFFICE VISIT (OUTPATIENT)
Dept: ORTHOPEDICS | Facility: CLINIC | Age: 54
End: 2024-05-31
Payer: MEDICAID

## 2024-05-31 DIAGNOSIS — M25.542 PAIN, JOINT, HAND, LEFT: ICD-10-CM

## 2024-05-31 DIAGNOSIS — R29.898 WEAKNESS OF LEFT HAND: ICD-10-CM

## 2024-05-31 DIAGNOSIS — M25.642 STIFFNESS OF JOINT, HAND, LEFT: Primary | ICD-10-CM

## 2024-05-31 DIAGNOSIS — S61.409A EXTENSOR TENDON LACERATION OF HAND WITH OPEN WOUND, INITIAL ENCOUNTER: Primary | ICD-10-CM

## 2024-05-31 DIAGNOSIS — S66.829A EXTENSOR TENDON LACERATION OF HAND WITH OPEN WOUND, INITIAL ENCOUNTER: Primary | ICD-10-CM

## 2024-05-31 DIAGNOSIS — R60.0 EDEMA OF HAND: ICD-10-CM

## 2024-05-31 PROCEDURE — 99999 PR PBB SHADOW E&M-EST. PATIENT-LVL I: CPT | Mod: PBBFAC,,, | Performed by: ORTHOPAEDIC SURGERY

## 2024-05-31 PROCEDURE — 1159F MED LIST DOCD IN RCRD: CPT | Mod: CPTII,,, | Performed by: ORTHOPAEDIC SURGERY

## 2024-05-31 PROCEDURE — 99024 POSTOP FOLLOW-UP VISIT: CPT | Mod: ,,, | Performed by: ORTHOPAEDIC SURGERY

## 2024-05-31 PROCEDURE — 99211 OFF/OP EST MAY X REQ PHY/QHP: CPT | Mod: PBBFAC,PO | Performed by: ORTHOPAEDIC SURGERY

## 2024-05-31 PROCEDURE — 97530 THERAPEUTIC ACTIVITIES: CPT | Mod: PO

## 2024-05-31 NOTE — PROGRESS NOTES
Mr Agarwal returns to clinic today.  Has a history of left thumb laceration with extensor tendon repair.  He was overall doing well.  He was working with therapy.  He has been wearing his thermoplastic brace     Physical exam: Examination of the left thumb reveals that the laceration is healing very well.  There is only mild edema.  He was able to hold the thumb in a fully extended position at the IP joint.  He can flex at the IP joint actively.  He does have sensation intact at the tip of the thumb     Assessment: Status post left thumb laceration with extensor tendon laceration     Plan:     1. He will begin to wean out of the thermoplastic splint 1 week     2. He will follow up with me in 3-4 weeks for repeat evaluation at which point I will consider increasing his activity

## 2024-05-31 NOTE — PROGRESS NOTES
[facility]  Occupational Therapy Treatment Note     Date: 5/31/2024  Name: Junaid Agarwal  [facility] Number: [id number]    Therapy Diagnosis:   Encounter Diagnoses   Name Primary?    Stiffness of joint, hand, left Yes    Pain, joint, hand, left     Weakness of left hand     Edema of hand      Physician: Hakeem Sevilla MD  Note:  Please begin therapy to the left hand.  Extensor tendon repair protocol.  Therapy must begin no later than Tuesday May 7th.  Please create a volar blocking thermoplastic custom splint      Frequency:  3 times per week     Duration:  6 weeks      Diagnosis: Status post left thumb extensor tendon repair    And per ortho note from 5/10/2024  Plan:     1.  Sutures were removed today and Steri-Strips are placed     2.  Will continue to wear the thermoplastic splint     3. Will start working with therapy for the extensor tendon repair protocol     4.  He will follow up in 3 weeks for repeat evaluation       And per last MD note from 5/31/2024:   Plan:      1. He will begin to wean out of the thermoplastic splint 1 week      2. He will follow up with me in 3-4 weeks for repeat evaluation at which point I will consider increasing his activity      Medical Diagnosis: S66.829A,S61.409A (ICD-10-CM) - Extensor tendon laceration of hand with open wound, initial encounter     Surgical Procedure and Date: PROCEDURE:   1. Irrigation and debridement of wound measuring 3 x 1 cm in size including skin and subcutaneous tissue   2. Left thumb/hand extensor pollicis longus and brevis tendon repair   DATE OF SURGERY: 04/26/2024      S/P: 5 weeks on 5/31/2024    Evaluation Date: 5/13/2024  Insurance Authorization Period Expiration:  Calendar year    Plan of Care Certification Period: 6 weeks = 6/14/2024  Date of Return to Referring Provider: 5/10/2024  Visit # / Visits authorized:  7 / 20  FOTO: 2nd visit  / 1  Next Reassessment at 10th visit or by 30 days = 6/05/2024     Time In: 6140    Time Out: 1628  Total Appointment Time: 18 min (timed min & untimed codes): 18 min      Precautions:  standard and post op per protocol   Subjective     Pt reports: No problems or complaints  He was compliant with home exercise program given last session.   Response to previous treatment: Good  Functional change: None allowed, full time splint wear at this time     Pain 0-10 scale   Functional Pain Scale Rating 0-10:   2/10 on average  2/10 at best  2/10 at worst  Location: L thumb/wrist  Description: Dull  Aggravating Factors: N/A  Easing Factors: Elevation    Objective   MEASUREMENTS  Last measurements below are from Eval unless otherwise noted.    CMS Impairment/Limitation/Restriction for FOTO Hand Survey: Hand, #2nd visit = 50%     Observation/Inspection/Wound/Incision/Scar wearing his volar blocking splint, no scab remains, healed     Sensation: Grossly WNL,     PROM ext L thumb +25 at IP and +15 at MP (WNL vs AROM on R)         Manual Muscle Test: NT                                       and Pinch Strength: NT at this time due to post operative status.     Special and/or Standardized Tests:  NT    Treatment     Mr. Agarwal received the following supervised modalities after being cleared for contradictions for 0 minutes:   Paraffin 10 min L thumb at 125 degrees (NT)    Mr. Agarwal received the following manual therapy techniques for 8 minutes:   - Gentle scar mob, retrograde massage to L thumb/scar    Mr. Agarwal received therapeutic exercises for 10 minutes including:  AROM in splint 3x10 flex and ext and to perfrom for home program every 1-2 hours while awake.   Hook fist digits 2-5 2x20 with thumb held in ext      Stefano participated in dynamic functional therapeutic activities to improve functional performance for 0  minutes, including:  NT    Home Exercises and Education Provided     Education provided:   -  Cont per previous. Begin AROM in splint 3x10 flex and ext and to perfrom for home program  every 1-2 hours while awake.     Written Home Exercises Provided: Return demo of AROM in splint     Exercises were reviewed and Mr. Agarwal was able to demonstrate them prior to the end of the session.  Stefano demonstrated good  understanding of the education provided.     See EMR under Media for exercises provided today and/or prior visit.        Assessment     Pt would continue to benefit from skilled OT. Full AROM with L thumb ext in splint today. Will begin weaning out of splint next Friday.    - Progress towards goals:  STG #1 has been met.    Stefano is progressing well towards his goals . Pt continues with good rehab potential.     Pt will continue to benefit from skilled outpatient occupational therapy to address the deficits listed in the problem list on initial evaluation in order to maximize pt's level of independence in the home and community.     Anticipated barriers to occupational therapy: None    Pt's spiritual, cultural and educational needs considered and pt agreeable to plan of care and goals.    Goals:  Short Term Goals: (30 days, 6/5/2024, and/or 10th visit) unless otherwise noted below.  1. Pt will be independent with HEP and splint wear/care and precautions in 1 visit (MET).  2. Pt will report decreased pain to a 1/10 at worst in LUE with ADLs in order to increase function/use of UE.   3. Pt will demo place and hold grossly WNL at 4 week post-op swapnil in prep for return to normal use of L hand for ADL.      Long Term Goals: (by discharge)  1. Pt will report decreased pain to 1-2/10 with ADLs to allow for increased function/use of UE.   2. Pt will exhibit WNL AROM of  L Wrist/hand to allow for Independent use of for all ADL/IADL tasks.  3. Pt will exhibit WFL  strength to allow a firm grasp during ADL/IADL (cooking utensils, tool use, carrying groceries, steering wheel, etc.)  4. Pt will exhibit WFL lateral pinch strength to allow writing,opening containers, and turning keys.  5  Pt will  return to PLOF with all ADL/IADL, leisure and job tasks.    Plan   Continue Occupational Therapy 3 times per week through current poc expiration date of 6/14/2024, in order to to decrease pain and edema, and increase A/PROM, strength, and functional use of L upper extremity.    Updates/Grading for next session:  Progress with OT per protocol      MERI Smith, ERIN

## 2024-06-03 ENCOUNTER — CLINICAL SUPPORT (OUTPATIENT)
Dept: REHABILITATION | Facility: HOSPITAL | Age: 54
End: 2024-06-03
Payer: MEDICAID

## 2024-06-03 DIAGNOSIS — R29.898 WEAKNESS OF LEFT HAND: ICD-10-CM

## 2024-06-03 DIAGNOSIS — M25.542 PAIN, JOINT, HAND, LEFT: ICD-10-CM

## 2024-06-03 DIAGNOSIS — R60.0 EDEMA OF HAND: ICD-10-CM

## 2024-06-03 DIAGNOSIS — M25.642 STIFFNESS OF JOINT, HAND, LEFT: Primary | ICD-10-CM

## 2024-06-03 PROCEDURE — 97530 THERAPEUTIC ACTIVITIES: CPT | Mod: PO

## 2024-06-05 ENCOUNTER — CLINICAL SUPPORT (OUTPATIENT)
Dept: REHABILITATION | Facility: HOSPITAL | Age: 54
End: 2024-06-05
Payer: MEDICAID

## 2024-06-05 DIAGNOSIS — R60.0 EDEMA OF HAND: ICD-10-CM

## 2024-06-05 DIAGNOSIS — M25.642 STIFFNESS OF JOINT, HAND, LEFT: Primary | ICD-10-CM

## 2024-06-05 DIAGNOSIS — M25.542 PAIN, JOINT, HAND, LEFT: ICD-10-CM

## 2024-06-05 DIAGNOSIS — R29.898 WEAKNESS OF LEFT HAND: ICD-10-CM

## 2024-06-05 PROCEDURE — 97530 THERAPEUTIC ACTIVITIES: CPT | Mod: PO

## 2024-06-05 NOTE — PROGRESS NOTES
[facility]  Occupational Therapy Treatment Note     Date: 6/5/2024  Name: Junaid Agarwal  [facility] Number: [id number]    Therapy Diagnosis:   Encounter Diagnoses   Name Primary?    Stiffness of joint, hand, left Yes    Pain, joint, hand, left     Weakness of left hand     Edema of hand        Physician: Hakeem Sevilla MD  Note:  Please begin therapy to the left hand.  Extensor tendon repair protocol.  Therapy must begin no later than Tuesday May 7th.  Please create a volar blocking thermoplastic custom splint      Frequency:  3 times per week     Duration:  6 weeks      Diagnosis: Status post left thumb extensor tendon repair    And per ortho note from 5/10/2024  Plan:     1.  Sutures were removed today and Steri-Strips are placed     2.  Will continue to wear the thermoplastic splint     3. Will start working with therapy for the extensor tendon repair protocol     4.  He will follow up in 3 weeks for repeat evaluation   Medical Diagnosis: S66.829A,S61.409A (ICD-10-CM) - Extensor tendon laceration of hand with open wound, initial encounter     Surgical Procedure and Date: PROCEDURE:   1. Irrigation and debridement of wound measuring 3 x 1 cm in size including skin and subcutaneous tissue   2. Left thumb/hand extensor pollicis longus and brevis tendon repair   DATE OF SURGERY: 04/26/2024      S/P: 5 weeks and 5 days on 6/5/2024    Evaluation Date: 5/13/2024  Insurance Authorization Period Expiration:  Calendar year    Plan of Care Certification Period: 6 weeks = 6/14/2024  Date of Return to Referring Provider: 5/10/2024  Visit # / Visits authorized:  *** 8 / 20  FOTO: Eval  / 1  Next Reassessment at 10th visit or by 30 days = 6/05/2024     Time In: 1645  Time Out: 1715  Total Appointment Time: 30 min (timed min & untimed codes):      Precautions:  standard and post op per protocol   Subjective     Pt reports: No problems or complaints with HEP upgrade last visit.   He was compliant  with home exercise program given last session.   Response to previous treatment: Good  Functional change: None allowed, full time splint wear at this time     Pain 0-10 scale   Functional Pain Scale Rating 0-10:   2/10 on average  2/10 at best  2/10 at worst  Location: L thumb/wrist  Description: Dull  Aggravating Factors: N/A  Easing Factors: Elevation    Objective   MEASUREMENTS  Last measurements below are from Eval unless otherwise noted.    CMS Impairment/Limitation/Restriction for FOTO Hand Survey: Hand, #2nd visit = 50%     Observation/Inspection/Wound/Incision/Scar wearing his volar blocking splint, no scab remains, healed     Sensation: Grossly WNL,     PROM ext L thumb +25 at IP and +15 at MP (WNL vs AROM on R)         Manual Muscle Test: NT                                       and Pinch Strength: NT at this time due to post operative status.     Special and/or Standardized Tests:  NT    Treatment     Mr. Agarwal received the following supervised modalities after being cleared for contradictions for 0 minutes:   Paraffin 10 min L thumb at 125 degrees (NT)    Mr. Agarwal received the following manual therapy techniques for 7 minutes:   - Gentle scar mob, retrograde massage to L thumb/scar    Mr. Agarwal received therapeutic exercises for 26 minutes including:  PROM thumb ext and AROM to block of splint 3x10  PROM thumb retroposition 30x   Gentle PROM isolated  IP/MP E/F composite to 30 degrees flexion   Gentle passive wrist tenodesis approx 20 flexion-20 degree extension  Flat fist on table x 2 min (NT)  Place and hold 3x10 in splint.   Active thumb extension followed by active flexion to limits of orthosis  Active wave, hook, fist, straight fist 20x ea in splint with thumb stabilized with straps    *Billed as 33 min therapeutic activity per Medicaid guidelines in the state of [place].   Stefano participated in dynamic functional therapeutic activities to improve functional performance for 0  minutes,  including:  NT    Home Exercises and Education Provided     Education provided:   -  Cont per previous.     Written Home Exercises Provided:  Patient instructed to cont prior HEP.    Exercises were reviewed and Mr. Agarwal was able to demonstrate them prior to the end of the session.  Stefano demonstrated good  understanding of the education provided.     See EMR under Media for exercises provided today and/or prior visit.        Assessment     Pt would continue to benefit from skilled OT. Pt continues with excellent active retroposition with no extension lag observed.      - Progress towards goals:  STG #3 has been met.    Stefano is progressing well towards his goals . Pt continues with good rehab potential.     Pt will continue to benefit from skilled outpatient occupational therapy to address the deficits listed in the problem list on initial evaluation in order to maximize pt's level of independence in the home and community.     Anticipated barriers to occupational therapy: None    Pt's spiritual, cultural and educational needs considered and pt agreeable to plan of care and goals.    Goals:  Short Term Goals: (30 days, 6/5/2024, and/or 10th visit) unless otherwise noted below.  1. Pt will be independent with HEP and splint wear/care and precautions in 1 visit (MET).  2. Pt will report decreased pain to a 1/10 at worst in LUE with ADLs in order to increase function/use of UE.   3. Pt will demo place and hold grossly WNL at 4 week post-op swapnil in prep for return to normal use of L hand for ADL. (Met)      Long Term Goals: (by discharge)  1. Pt will report decreased pain to 1-2/10 with ADLs to allow for increased function/use of UE.   2. Pt will exhibit WNL AROM of  L Wrist/hand to allow for Independent use of for all ADL/IADL tasks.  3. Pt will exhibit WFL  strength to allow a firm grasp during ADL/IADL (cooking utensils, tool use, carrying groceries, steering wheel, etc.)  4. Pt will exhibit WFL lateral  pinch strength to allow writing,opening containers, and turning keys.  5  Pt will return to PLOF with all ADL/IADL, leisure and job tasks.    Plan   Continue Occupational Therapy 3 times per week through current poc expiration date of 6/14/2024, in order to to decrease pain and edema, and increase A/PROM, strength, and functional use of L upper extremity.    Updates/Grading for next session:  Progress with OT per protocol    MERI Smith/ERIN

## 2024-06-05 NOTE — PROGRESS NOTES
[facility]  Occupational Therapy Treatment Note     Date: 6/5/2024  Name: Junaid Agarwal  [facility] Number: [id number]    Therapy Diagnosis:   Encounter Diagnoses   Name Primary?    Stiffness of joint, hand, left Yes    Pain, joint, hand, left     Weakness of left hand     Edema of hand          Physician: Hakeem Sevilla MD  Note:  Please begin therapy to the left hand.  Extensor tendon repair protocol.  Therapy must begin no later than Tuesday May 7th.  Please create a volar blocking thermoplastic custom splint      Frequency:  3 times per week     Duration:  6 weeks      Diagnosis: Status post left thumb extensor tendon repair    And per ortho note from 5/10/2024  Plan:     1.  Sutures were removed today and Steri-Strips are placed     2.  Will continue to wear the thermoplastic splint     3. Will start working with therapy for the extensor tendon repair protocol     4.  He will follow up in 3 weeks for repeat evaluation   Medical Diagnosis: S66.829A,S61.409A (ICD-10-CM) - Extensor tendon laceration of hand with open wound, initial encounter     Surgical Procedure and Date: PROCEDURE:   1. Irrigation and debridement of wound measuring 3 x 1 cm in size including skin and subcutaneous tissue   2. Left thumb/hand extensor pollicis longus and brevis tendon repair   DATE OF SURGERY: 04/26/2024      S/P: 5 weeks  on 5/31/2024    Evaluation Date: 5/13/2024  Insurance Authorization Period Expiration:  Calendar year    Plan of Care Certification Period: 6 weeks = 6/14/2024  Date of Return to Referring Provider: 5/10/2024  Visit # / Visits authorized:  8 / 20  FOTO: Adrien  / Ana  Next Reassessment at 10th visit or by 30 days = 6/05/2024     Time In: 1616  Time Out: 1645  Total Appointment Time: 29 min (timed min & untimed codes):      Precautions:  standard and post op per protocol   Subjective     Pt reports: No changes since yesterday. Excited to progress to weaning out of the splint.    He was  compliant with home exercise program given last session.   Response to previous treatment: Good  Functional change: None allowed, full time splint wear at this time     Pain 0-10 scale   Functional Pain Scale Rating 0-10:   2/10 on average  2/10 at best  2/10 at worst  Location: L thumb/wrist  Description: Dull  Aggravating Factors: N/A  Easing Factors: Elevation    Objective   MEASUREMENTS  Last measurements below are from Eval unless otherwise noted.    CMS Impairment/Limitation/Restriction for FOTO Hand Survey: Hand, #2nd visit = 50%     Observation/Inspection/Wound/Incision/Scar wearing his volar blocking splint, no scab remains, healed     Sensation: Grossly WNL,     PROM ext L thumb +25 at IP and +15 at MP (WNL vs AROM on R)         Manual Muscle Test: NT                                       and Pinch Strength: NT at this time due to post operative status.     Special and/or Standardized Tests:  NT    Treatment     Mr. Agarwal received the following supervised modalities after being cleared for contradictions for 0 minutes:   Paraffin 10 min L thumb at 125 degrees (NT)    Mr. Agarwal received the following manual therapy techniques for 7 minutes:   - Gentle scar mob, retrograde massage to L thumb/scar    Mr. Agarwal received therapeutic exercises for 22 minutes including:  PROM thumb ext and AROM to block of splint 3x10  PROM thumb retroposition 30x   Gentle PROM isolated  IP/MP E/F composite to 30 degrees flexion   Gentle passive wrist tenodesis approx 20 flexion-20 degree extension  Flat fist on table x 2 min (NT)  Place and hold 3x10 in splint.   Active thumb extension followed by active flexion to limits of orthosis  Active wave, hook, fist, straight fist 20x ea in splint with thumb stabilized with straps    *Billed as 29 min therapeutic activity per Medicaid guidelines in the state of [place].   Stefano participated in dynamic functional therapeutic activities to improve functional performance for 0   minutes, including:  NT    Home Exercises and Education Provided     Education provided:   -  Cont per previous.     Written Home Exercises Provided:  Patient instructed to cont prior HEP.    Exercises were reviewed and Mr. Agarwal was able to demonstrate them prior to the end of the session.  Stefano demonstrated good  understanding of the education provided.     See EMR under Media for exercises provided today and/or prior visit.        Assessment     Pt would continue to benefit from skilled OT. Pt continues with excellent active retroposition with no extension lag observed.      - Progress towards goals:  STG #3 has been met.    Stefano is progressing well towards his goals . Pt continues with good rehab potential.     Pt will continue to benefit from skilled outpatient occupational therapy to address the deficits listed in the problem list on initial evaluation in order to maximize pt's level of independence in the home and community.     Anticipated barriers to occupational therapy: None    Pt's spiritual, cultural and educational needs considered and pt agreeable to plan of care and goals.    Goals:  Short Term Goals: (30 days, 6/5/2024, and/or 10th visit) unless otherwise noted below.  1. Pt will be independent with HEP and splint wear/care and precautions in 1 visit (MET).  2. Pt will report decreased pain to a 1/10 at worst in LUE with ADLs in order to increase function/use of UE.   3. Pt will demo place and hold grossly WNL at 4 week post-op swapnil in prep for return to normal use of L hand for ADL. (Met)      Long Term Goals: (by discharge)  1. Pt will report decreased pain to 1-2/10 with ADLs to allow for increased function/use of UE.   2. Pt will exhibit WNL AROM of  L Wrist/hand to allow for Independent use of for all ADL/IADL tasks.  3. Pt will exhibit WFL  strength to allow a firm grasp during ADL/IADL (cooking utensils, tool use, carrying groceries, steering wheel, etc.)  4. Pt will exhibit WFL  lateral pinch strength to allow writing,opening containers, and turning keys.  5  Pt will return to PLOF with all ADL/IADL, leisure and job tasks.    Plan   Continue Occupational Therapy 3 times per week through current poc expiration date of 6/14/2024, in order to to decrease pain and edema, and increase A/PROM, strength, and functional use of L upper extremity.    Updates/Grading for next session:  Progress with OT per protocol    MERI Knott, PREETIT

## 2024-06-07 ENCOUNTER — CLINICAL SUPPORT (OUTPATIENT)
Dept: REHABILITATION | Facility: HOSPITAL | Age: 54
End: 2024-06-07
Payer: MEDICAID

## 2024-06-07 DIAGNOSIS — R60.0 EDEMA OF HAND: ICD-10-CM

## 2024-06-07 DIAGNOSIS — M25.542 PAIN, JOINT, HAND, LEFT: ICD-10-CM

## 2024-06-07 DIAGNOSIS — R29.898 WEAKNESS OF LEFT HAND: ICD-10-CM

## 2024-06-07 DIAGNOSIS — M25.642 STIFFNESS OF JOINT, HAND, LEFT: Primary | ICD-10-CM

## 2024-06-07 PROCEDURE — 97530 THERAPEUTIC ACTIVITIES: CPT | Mod: PO

## 2024-06-07 NOTE — PROGRESS NOTES
[facility]  Occupational Therapy Treatment Note     Date: 6/7/2024  Name: Junaid Agarwal  [facility] Number: [id number]    Therapy Diagnosis:   Encounter Diagnoses   Name Primary?    Stiffness of joint, hand, left Yes    Pain, joint, hand, left     Weakness of left hand     Edema of hand        Physician: Hakeem Sevilla MD  Note:  Please begin therapy to the left hand.  Extensor tendon repair protocol.  Therapy must begin no later than Tuesday May 7th.  Please create a volar blocking thermoplastic custom splint      Frequency:  3 times per week     Duration:  6 weeks      Diagnosis: Status post left thumb extensor tendon repair    And per ortho note from 5/10/2024  Plan:     1.  Sutures were removed today and Steri-Strips are placed     2.  Will continue to wear the thermoplastic splint     3. Will start working with therapy for the extensor tendon repair protocol     4.  He will follow up in 3 weeks for repeat evaluation   Medical Diagnosis: S66.829A,S61.409A (ICD-10-CM) - Extensor tendon laceration of hand with open wound, initial encounter     Surgical Procedure and Date: PROCEDURE:   1. Irrigation and debridement of wound measuring 3 x 1 cm in size including skin and subcutaneous tissue   2. Left thumb/hand extensor pollicis longus and brevis tendon repair   DATE OF SURGERY: 04/26/2024      S/P: 6 weeks on 6/7/2024    Evaluation Date: 5/13/2024  Insurance Authorization Period Expiration:  Calendar year    Plan of Care Certification Period: 6 weeks = 6/14/2024  Date of Return to Referring Provider: 5/10/2024  Visit # / Visits authorized:  9 / 20  FOTO: Eval/2nd visit  / 1  Next Reassessment at 10th visit or by 30 days = 6/05/2024     Time In: 1545   Time Out: 1610  Total Appointment Time: 25 min (timed min & untimed codes):      Precautions:  standard and post op per protocol   Subjective     Pt reports: No changes since yesterday. Excited to progress to weaning out of the splint.     He was compliant with home exercise program given last session.   Response to previous treatment: Good  Functional change: None allowed, full time splint wear at this time     Pain 0-10 scale   Functional Pain Scale Rating 0-10:   2/10 on average  2/10 at best  2/10 at worst  Location: L thumb/wrist  Description: Dull  Aggravating Factors: N/A  Easing Factors: Elevation    Objective   MEASUREMENTS  Last measurements below are from Eval unless otherwise noted.    CMS Impairment/Limitation/Restriction for FOTO Hand Survey: Hand, #2nd visit = 50%     Observation/Inspection/Wound/Incision/Scar wearing his volar blocking splint, no scab remains, healed     Sensation: Grossly WNL,     6/7/2024  AROM   Ext L thumb WNL on   Wrist flex/ext L = 55/65 vs 45/60 on R (old fx with ORIF on R wrist) (Post L = 75/62)  Thumb flexion to DPC = -4.5 cm vs -1.5 on R (Post L =-3.5)         Manual Muscle Test: NT                                       and Pinch Strength: NT at this time due to post operative status.     Special and/or Standardized Tests:  NT    Treatment     Mr. Agarwal received the following supervised modalities after being cleared for contradictions for 0 minutes:   Paraffin 10 min L thumb at 125 degrees (NT)    Mr. Agarwal received the following manual therapy techniques for 2 minutes:   - Gentle scar mob, retrograde massage to L thumb/scar    Mr. Agarwal received therapeutic exercises for 23 minutes including:  - AROM out of splint: Tendon Gliding Exercises, Wrist AROM with gravity; flex/ext, thumb touches to each finger, isolated IP flexion and composite flex/ext with handout and return demo for 2x10 recommended 6-8 x day.      *Billed as 29 min therapeutic activity per Medicaid guidelines in the state of [place].   Stefano participated in dynamic functional therapeutic activities to improve functional performance for 0  minutes, including:  NT    Home Exercises and Education Provided     Education provided:   -   Cont per previous.     Written Home Exercises Provided:  Patient instructed to cont prior HEP.    Exercises were reviewed and Mr. Agarwal was able to demonstrate them prior to the end of the session.  Stefano demonstrated good  understanding of the education provided.     See EMR under Media for exercises provided today and/or prior visit.        Assessment     Pt would continue to benefit from skilled OT. Pt with excellent initial AROM out of splint and will begin weaning out over the next week; while following a 1-2# wt limit.      - Progress towards goals:  STG #3 has been met.    Stefano is progressing well towards his goals . Pt continues with good rehab potential.     Pt will continue to benefit from skilled outpatient occupational therapy to address the deficits listed in the problem list on initial evaluation in order to maximize pt's level of independence in the home and community.     Anticipated barriers to occupational therapy: None    Pt's spiritual, cultural and educational needs considered and pt agreeable to plan of care and goals.    Goals:  Short Term Goals: (30 days, 6/5/2024, and/or 10th visit) unless otherwise noted below.  1. Pt will be independent with HEP and splint wear/care and precautions in 1 visit (MET).  2. Pt will report decreased pain to a 1/10 at worst in LUE with ADLs in order to increase function/use of UE.   3. Pt will demo place and hold grossly WNL at 4 week post-op swapnil in prep for return to normal use of L hand for ADL. (Met)      Long Term Goals: (by discharge)  1. Pt will report decreased pain to 1-2/10 with ADLs to allow for increased function/use of UE.   2. Pt will exhibit WNL AROM of  L Wrist/hand to allow for Independent use of for all ADL/IADL tasks.  3. Pt will exhibit WFL  strength to allow a firm grasp during ADL/IADL (cooking utensils, tool use, carrying groceries, steering wheel, etc.)  4. Pt will exhibit WFL lateral pinch strength to allow writing,opening  containers, and turning keys.  5  Pt will return to PLOF with all ADL/IADL, leisure and job tasks.    Plan   Continue Occupational Therapy 3 times per week through current poc expiration date of 6/14/2024, in order to to decrease pain and edema, and increase A/PROM, strength, and functional use of L upper extremity.    Updates/Grading for next session:  Progress with OT per protocol    MERI Smith/ERIN

## 2024-06-28 ENCOUNTER — OFFICE VISIT (OUTPATIENT)
Dept: ORTHOPEDICS | Facility: CLINIC | Age: 54
End: 2024-06-28
Payer: MEDICAID

## 2024-06-28 DIAGNOSIS — S66.829A EXTENSOR TENDON LACERATION OF HAND WITH OPEN WOUND, INITIAL ENCOUNTER: Primary | ICD-10-CM

## 2024-06-28 DIAGNOSIS — S61.409A EXTENSOR TENDON LACERATION OF HAND WITH OPEN WOUND, INITIAL ENCOUNTER: Primary | ICD-10-CM

## 2024-06-28 PROCEDURE — 99999 PR PBB SHADOW E&M-EST. PATIENT-LVL I: CPT | Mod: PBBFAC,,, | Performed by: ORTHOPAEDIC SURGERY

## 2024-06-28 PROCEDURE — 99211 OFF/OP EST MAY X REQ PHY/QHP: CPT | Mod: PBBFAC,PO | Performed by: ORTHOPAEDIC SURGERY

## 2025-03-18 ENCOUNTER — DOCUMENTATION ONLY (OUTPATIENT)
Dept: REHABILITATION | Facility: HOSPITAL | Age: 55
End: 2025-03-18
Payer: MEDICAID

## 2025-03-18 PROBLEM — M25.642 STIFFNESS OF JOINT, HAND, LEFT: Status: RESOLVED | Noted: 2024-05-06 | Resolved: 2025-03-18

## 2025-03-18 PROBLEM — R60.0 EDEMA OF HAND: Status: RESOLVED | Noted: 2024-05-06 | Resolved: 2025-03-18

## 2025-03-18 PROBLEM — M25.542 PAIN, JOINT, HAND, LEFT: Status: RESOLVED | Noted: 2024-05-06 | Resolved: 2025-03-18

## 2025-03-18 PROBLEM — R29.898 WEAKNESS OF LEFT HAND: Status: RESOLVED | Noted: 2024-05-06 | Resolved: 2025-03-18

## 2025-03-18 NOTE — PROGRESS NOTES
Occupational Therapy D/C Note  Patient:  Junaid Agarwal  [facility] #:  [id number]   Date of Note: 03/18/2025     Pt was referred by Hakeem Sevilla MD  and seen for initial evaluation on 5/13/2024 for tatus post left thumb extensor tendon repair. Pt attended 9 OT visits. See last OT Tx note dated 06/07/2024, for last objective measures as well as goal achievement.  Pt has not attended OT since. Current status is unknown.   Pt to be discharged from OT at this time.    MERI Smith/ERIN